# Patient Record
Sex: MALE | Race: WHITE | HISPANIC OR LATINO
[De-identification: names, ages, dates, MRNs, and addresses within clinical notes are randomized per-mention and may not be internally consistent; named-entity substitution may affect disease eponyms.]

---

## 2018-08-27 PROBLEM — Z00.00 ENCOUNTER FOR PREVENTIVE HEALTH EXAMINATION: Status: ACTIVE | Noted: 2018-08-27

## 2018-10-16 ENCOUNTER — APPOINTMENT (OUTPATIENT)
Dept: OPHTHALMOLOGY | Facility: CLINIC | Age: 63
End: 2018-10-16
Payer: COMMERCIAL

## 2018-10-16 PROCEDURE — 92004 COMPRE OPH EXAM NEW PT 1/>: CPT

## 2018-10-16 PROCEDURE — 92134 CPTRZ OPH DX IMG PST SGM RTA: CPT

## 2019-01-15 ENCOUNTER — APPOINTMENT (OUTPATIENT)
Dept: OPHTHALMOLOGY | Facility: CLINIC | Age: 64
End: 2019-01-15
Payer: COMMERCIAL

## 2019-01-15 PROCEDURE — 92235 FLUORESCEIN ANGRPH MLTIFRAME: CPT

## 2019-01-15 PROCEDURE — 92012 INTRM OPH EXAM EST PATIENT: CPT

## 2019-01-15 PROCEDURE — 92134 CPTRZ OPH DX IMG PST SGM RTA: CPT

## 2019-03-12 ENCOUNTER — APPOINTMENT (OUTPATIENT)
Dept: OPHTHALMOLOGY | Facility: CLINIC | Age: 64
End: 2019-03-12
Payer: MEDICARE

## 2019-03-12 DIAGNOSIS — H35.00 UNSPECIFIED BACKGROUND RETINOPATHY: ICD-10-CM

## 2019-03-12 DIAGNOSIS — H25.13 AGE-RELATED NUCLEAR CATARACT, BILATERAL: ICD-10-CM

## 2019-03-12 DIAGNOSIS — H35.352 CYSTOID MACULAR DEGENERATION, LEFT EYE: ICD-10-CM

## 2019-03-12 DIAGNOSIS — H35.3190 NONEXUDATIVE AGE-RELATED MACULAR DEGENERATION, UNSPECIFIED EYE, STAGE UNSPECIFIED: ICD-10-CM

## 2019-03-12 PROCEDURE — 92012 INTRM OPH EXAM EST PATIENT: CPT

## 2019-03-12 PROCEDURE — 92226: CPT | Mod: RT

## 2019-03-12 PROCEDURE — 92134 CPTRZ OPH DX IMG PST SGM RTA: CPT

## 2020-04-22 ENCOUNTER — APPOINTMENT (OUTPATIENT)
Dept: UROLOGY | Facility: CLINIC | Age: 65
End: 2020-04-22
Payer: COMMERCIAL

## 2020-04-22 ENCOUNTER — APPOINTMENT (OUTPATIENT)
Dept: UROLOGY | Facility: CLINIC | Age: 65
End: 2020-04-22

## 2020-04-22 DIAGNOSIS — Z78.9 OTHER SPECIFIED HEALTH STATUS: ICD-10-CM

## 2020-04-22 DIAGNOSIS — Z83.3 FAMILY HISTORY OF DIABETES MELLITUS: ICD-10-CM

## 2020-04-22 DIAGNOSIS — E11.9 TYPE 2 DIABETES MELLITUS W/OUT COMPLICATIONS: ICD-10-CM

## 2020-04-22 DIAGNOSIS — Z82.0 FAMILY HISTORY OF EPILEPSY AND OTHER DISEASES OF THE NERVOUS SYSTEM: ICD-10-CM

## 2020-04-22 DIAGNOSIS — I10 ESSENTIAL (PRIMARY) HYPERTENSION: ICD-10-CM

## 2020-04-22 DIAGNOSIS — Z80.0 FAMILY HISTORY OF MALIGNANT NEOPLASM OF DIGESTIVE ORGANS: ICD-10-CM

## 2020-04-22 DIAGNOSIS — Z87.891 PERSONAL HISTORY OF NICOTINE DEPENDENCE: ICD-10-CM

## 2020-04-22 DIAGNOSIS — J30.2 OTHER SEASONAL ALLERGIC RHINITIS: ICD-10-CM

## 2020-04-22 PROCEDURE — 99443: CPT

## 2020-04-22 RX ORDER — CETIRIZINE HCL 10 MG
TABLET ORAL
Refills: 0 | Status: ACTIVE | COMMUNITY

## 2020-04-22 NOTE — ASSESSMENT
[FreeTextEntry1] : I discussed the findings and options with Mr. ARIELA FERNÁNDEZ in detail. He will continue with the self dilations as needed.  I have ordered #14 and #12 [place] catheters (#10, R6, each) as well as the Lidocaine 2% jelly (30ml, #6, R12).  He will also continue with the nitrofurantoin low dose prophylaxis as well as the Cialis prn (Rx 20mg #10, R6).\par \par Once the COVID 19 pandemic has resolved Mr. Fernández will schedule an appointment for a flow rate, bladder sonogram and PSA.\par

## 2020-04-22 NOTE — LETTER BODY
[Dear  ___] : Dear  [unfilled], [Please see my note below.] : Please see my note below. [Consult Closing:] : Thank you very much for allowing me to participate in the care of this patient.  If you have any questions, please do not hesitate to contact me. [Consult Letter:] : I had the pleasure of evaluating your patient, [unfilled]. [Sincerely,] : Sincerely, [FreeTextEntry3] : Husam Plummer MD, FACS\par

## 2020-04-22 NOTE — HISTORY OF PRESENT ILLNESS
[FreeTextEntry1] : I contacted Mr. ARIELA FERNÁNDEZ by telemedicine using the [employer] platform. This was aborted due to system malfunction after more than 20 minutes of repeated tries. The appropriate consent was obtained prior to the conference.  The primary purpose of the meeting was to discuss his genitourinary symptoms.  \par \par Mr. Fernández has a long history of urethral stricture disease since undergoing a circumcision in 1970.  He had multiple urethral dilations and in December 2010 underwent an augmented urethroplasty using two buccal grafts (7+3 cm).  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 Fernández has moderate stable lower urinary tract symptoms (obstructive and irritative) with elevated postvoid residuals.  He had been on long-term Bactrim SS prophylaxis at bedtime but was changed to nitrofurantoin 50mg one year ago.  He has not had any symptomatic UTIs during the past year.  Mr. Fernández catheterizes the distal urethra approximately once every two weeks using usually a 14 [place] catheter (and rarely a 12 Fr). \par His most recent PVR (3/11/19) was 283cc. \par \par He also has a several year onset ED, for which he uses Cialis 1/2 20mg prn, without any side effects.\par \par PSAs: 4/13/18--0.18; 7/16--0.43; 6/20/16--0.4; 12/14/15--0.4; 12/3/14--0.3; 12/2/13--0.1; 12/5/12--0.4

## 2021-01-13 ENCOUNTER — APPOINTMENT (OUTPATIENT)
Dept: UROLOGY | Facility: CLINIC | Age: 66
End: 2021-01-13
Payer: COMMERCIAL

## 2021-01-20 ENCOUNTER — APPOINTMENT (OUTPATIENT)
Dept: UROLOGY | Facility: CLINIC | Age: 66
End: 2021-01-20
Payer: COMMERCIAL

## 2021-01-20 ENCOUNTER — TRANSCRIPTION ENCOUNTER (OUTPATIENT)
Age: 66
End: 2021-01-20

## 2021-01-20 VITALS
DIASTOLIC BLOOD PRESSURE: 81 MMHG | OXYGEN SATURATION: 98 % | SYSTOLIC BLOOD PRESSURE: 137 MMHG | BODY MASS INDEX: 26.22 KG/M2 | HEART RATE: 69 BPM | WEIGHT: 169 LBS | HEIGHT: 67.5 IN

## 2021-01-20 PROCEDURE — 51741 ELECTRO-UROFLOWMETRY FIRST: CPT

## 2021-01-20 PROCEDURE — 99215 OFFICE O/P EST HI 40 MIN: CPT | Mod: 25

## 2021-01-20 PROCEDURE — 81003 URINALYSIS AUTO W/O SCOPE: CPT | Mod: QW

## 2021-01-20 PROCEDURE — 51798 US URINE CAPACITY MEASURE: CPT | Mod: 59

## 2021-01-20 RX ORDER — LIDOCAINE HYDROCHLORIDE 20 MG/ML
2 JELLY TOPICAL
Qty: 5 | Refills: 11 | Status: ACTIVE | COMMUNITY
Start: 2020-03-05 | End: 1900-01-01

## 2021-01-20 RX ORDER — AMLODIPINE BESYLATE 5 MG/1
5 TABLET ORAL
Refills: 0 | Status: ACTIVE | COMMUNITY

## 2021-01-20 RX ORDER — NITROFURANTOIN MACROCRYSTALS 50 MG/1
50 CAPSULE ORAL
Qty: 90 | Refills: 3 | Status: ACTIVE | COMMUNITY
Start: 2020-03-05 | End: 1900-01-01

## 2021-01-20 RX ORDER — TADALAFIL 20 MG/1
20 TABLET ORAL
Qty: 12 | Refills: 6 | Status: ACTIVE | COMMUNITY
Start: 1900-01-01 | End: 1900-01-01

## 2021-01-20 RX ORDER — ALFUZOSIN HYDROCHLORIDE 10 MG/1
10 TABLET, EXTENDED RELEASE ORAL
Qty: 90 | Refills: 3 | Status: ACTIVE | COMMUNITY
Start: 2021-01-20 | End: 1900-01-01

## 2021-01-20 NOTE — ASSESSMENT
[FreeTextEntry1] : I discussed the findings and options with Mr. ARIELA FERNÁNDEZ in detail. I am concerned about the increased retention.  Mr. Fernández will try and dilate more regularly, monitoring his residuals with each catheterization. In addition, he will start on alfuzosin 10mg qhs.  I would like to see him in 3 months (flow, bladder sono).  He will continue with the nitrofurantoin low dose prophylaxis as well as the Cialis prn.  He will likely need a RUG at some point in the near future. \par I will call Mr. Fernández with the PSA and CMP results. \par \par \par

## 2021-01-20 NOTE — PHYSICAL EXAM
[Abdomen Mass (___ Cm)] : no abdominal mass palpated [Abdomen Hernia] : no hernia was discovered [Penis Abnormality] : normal circumcised penis [Epididymis] : the epididymides were normal [Testes Tenderness] : no tenderness of the testes [Prostate Tenderness] : the prostate was not tender [Skin Color & Pigmentation] : normal skin color and pigmentation [Heart Rate And Rhythm] : Heart rate and rhythm were normal [FreeTextEntry1] : glans LSA with meatal stenosis

## 2021-01-20 NOTE — HISTORY OF PRESENT ILLNESS
[FreeTextEntry1] : Mr. ARIELA FERNÁNDEZ comes in today for his urologic follow-up.  He presents with relatively stable moderate lower urinary tract symptoms (obstructive and irritative) with elevated postvoid residuals.  He is continuing on nitrofurantoin 50mg prophylaxis (having been on Bactrim SS previously).  He has not had any symptomatic UTIs for approximately the past 2 years.  Mr. Fernández catheterizes the distal urethra approximately once or twice a week using usually a 14 [place] catheter (and rarely a 12 Fr). \par IPSS: 22/35\par Sono:  440cc PVR (2nd attempt) (His prior PVR from 3/11/19 was 283cc). \par Flow: Maximum: 8.2 ml/s, Volume: 248.7ml\par \par Mr. Fernández has a long history of urethral stricture disease since undergoing a circumcision in 1970.  He had multiple urethral dilations and in December 2010 underwent an augmented urethroplasty using two buccal grafts (7+3 cm).  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 Fernández has a long history of genital herpes.  He has not had a visible outbreak for >10 years.\par \par PSAs: 4/13/18--0.18; 7/16--0.43; 6/20/16--0.4; 12/14/15--0.4; 12/3/14--0.3; 12/2/13--0.1; 12/5/12--0.4\par \par Creat: 11/18/10--1.2; 7/8/10--1.8

## 2021-01-21 LAB
ALBUMIN SERPL ELPH-MCNC: 4.6 G/DL
ALP BLD-CCNC: 84 U/L
ALT SERPL-CCNC: 40 U/L
ANION GAP SERPL CALC-SCNC: 13 MMOL/L
AST SERPL-CCNC: 34 U/L
BILIRUB SERPL-MCNC: 0.8 MG/DL
BUN SERPL-MCNC: 15 MG/DL
CALCIUM SERPL-MCNC: 9.9 MG/DL
CHLORIDE SERPL-SCNC: 100 MMOL/L
CO2 SERPL-SCNC: 24 MMOL/L
CREAT SERPL-MCNC: 1.12 MG/DL
GLUCOSE SERPL-MCNC: 128 MG/DL
POTASSIUM SERPL-SCNC: 5 MMOL/L
PROT SERPL-MCNC: 7.8 G/DL
SODIUM SERPL-SCNC: 137 MMOL/L

## 2021-04-07 ENCOUNTER — APPOINTMENT (OUTPATIENT)
Dept: UROLOGY | Facility: CLINIC | Age: 66
End: 2021-04-07
Payer: COMMERCIAL

## 2021-04-07 VITALS
RESPIRATION RATE: 14 BRPM | TEMPERATURE: 98.4 F | HEART RATE: 88 BPM | BODY MASS INDEX: 23.58 KG/M2 | SYSTOLIC BLOOD PRESSURE: 149 MMHG | OXYGEN SATURATION: 96 % | HEIGHT: 67.5 IN | WEIGHT: 152 LBS | DIASTOLIC BLOOD PRESSURE: 84 MMHG

## 2021-04-07 PROCEDURE — 51741 ELECTRO-UROFLOWMETRY FIRST: CPT

## 2021-04-07 PROCEDURE — 51798 US URINE CAPACITY MEASURE: CPT

## 2021-04-07 PROCEDURE — 99215 OFFICE O/P EST HI 40 MIN: CPT

## 2021-04-07 NOTE — HISTORY OF PRESENT ILLNESS
[FreeTextEntry1] : Mr. ARIELA FERNÁNDEZ comes in today for his urologic follow-up.  He reports stable voiding symptoms (obstructive and irritative) with known longstanding postvoid residuals. He is continuing on nitrofurantoin 50mg prophylaxis (having been on Bactrim SS previously) and alfuzosin 10mg qhs.  He has not had any symptomatic UTIs for approximately the past 2 years.  Mr. Fernández catheterizes the distal urethra approximately once or twice a week using usually a 14 [place] catheter (and rarely a 12 Fr). \par IPSS: 19/35\par Sono:  578cc\par Flow: Peak: 6.1ml/s, Volume 277.9ml\par \par Mr. Fernández has a long history of urethral stricture disease since undergoing a circumcision in 1970.  He had multiple urethral dilations and in December 2010 underwent an augmented urethroplasty using two buccal grafts (7+3 cm).  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 Fernández has a long history of genital herpes.  He has not had a visible outbreak for >10 years and does not premedicate.\par \par PSAs: 4/13/18--0.18; 7/16--0.43; 6/20/16--0.4; 12/14/15--0.4; 12/3/14--0.3; 12/2/13--0.1; 12/5/12--0.4\par \par Creat: 1/21/21--1.12; 11/18/10--1.2; 7/8/10--1.8

## 2021-04-07 NOTE — PHYSICAL EXAM
[General Appearance - Well Developed] : well developed [General Appearance - Well Nourished] : well nourished [Normal Appearance] : normal appearance [Well Groomed] : well groomed [General Appearance - In No Acute Distress] : no acute distress [Abdomen Soft] : soft [Abdomen Tenderness] : non-tender [Abdomen Mass (___ Cm)] : no abdominal mass palpated [Costovertebral Angle Tenderness] : no ~M costovertebral angle tenderness [Penis Abnormality] : normal circumcised penis [Urinary Bladder Findings] : the bladder was normal on palpation [Scrotum] : the scrotum was normal [Epididymis] : the epididymides were normal [Testes Tenderness] : no tenderness of the testes [Testes Mass (___cm)] : there were no testicular masses [Skin Color & Pigmentation] : normal skin color and pigmentation [Heart Rate And Rhythm] : Heart rate and rhythm were normal [Edema] : no peripheral edema [] : no respiratory distress [Exaggerated Use Of Accessory Muscles For Inspiration] : no accessory muscle use [Respiration, Rhythm And Depth] : normal respiratory rhythm and effort [Oriented To Time, Place, And Person] : oriented to person, place, and time [Affect] : the affect was normal [Mood] : the mood was normal [Not Anxious] : not anxious [No Focal Deficits] : no focal deficits [Normal Station and Gait] : the gait and station were normal for the patient's age [No Palpable Adenopathy] : no palpable adenopathy [FreeTextEntry1] : Glans LSA with meatal stenosis

## 2021-04-07 NOTE — ADDENDUM
[FreeTextEntry1] : A portion of this note was written by [Ramesh Whitney] on 04/05/2021 acting as a scribe for Dr. Plummer. \par \par I have personally reviewed the chart and agree that the record accurately reflects my personal performance of the history, physical exam, assessment, and plan.

## 2021-04-07 NOTE — LETTER BODY
[Dear  ___] : Dear  [unfilled], [Consult Letter:] : I had the pleasure of evaluating your patient, [unfilled]. [Please see my note below.] : Please see my note below. [Consult Closing:] : Thank you very much for allowing me to participate in the care of this patient.  If you have any questions, please do not hesitate to contact me. [Sincerely,] : Sincerely, [FreeTextEntry3] : Husam Plummer MD, FACS\par

## 2021-04-14 ENCOUNTER — APPOINTMENT (OUTPATIENT)
Dept: HEART AND VASCULAR | Facility: CLINIC | Age: 66
End: 2021-04-14
Payer: COMMERCIAL

## 2021-04-14 ENCOUNTER — LABORATORY RESULT (OUTPATIENT)
Age: 66
End: 2021-04-14

## 2021-04-14 PROCEDURE — 81050 URINALYSIS VOLUME MEASURE: CPT

## 2021-05-03 ENCOUNTER — APPOINTMENT (OUTPATIENT)
Dept: UROLOGY | Facility: CLINIC | Age: 66
End: 2021-05-03
Payer: MEDICARE

## 2021-05-03 VITALS
HEART RATE: 73 BPM | DIASTOLIC BLOOD PRESSURE: 78 MMHG | OXYGEN SATURATION: 98 % | SYSTOLIC BLOOD PRESSURE: 124 MMHG | TEMPERATURE: 97.9 F

## 2021-05-03 DIAGNOSIS — L90.0 LICHEN SCLEROSUS ET ATROPHICUS: ICD-10-CM

## 2021-05-03 DIAGNOSIS — N18.2 CHRONIC KIDNEY DISEASE, STAGE 2 (MILD): ICD-10-CM

## 2021-05-03 DIAGNOSIS — R35.1 BENIGN PROSTATIC HYPERPLASIA WITH LOWER URINARY TRACT SYMPMS: ICD-10-CM

## 2021-05-03 DIAGNOSIS — R33.9 RETENTION OF URINE, UNSPECIFIED: ICD-10-CM

## 2021-05-03 DIAGNOSIS — Q55.9 CONGENITAL MALFORMATION OF MALE GENITAL ORGAN, UNSPECIFIED: ICD-10-CM

## 2021-05-03 DIAGNOSIS — N35.914 UNSPECIFIED ANTERIOR URETHRAL STRICTURE, MALE: ICD-10-CM

## 2021-05-03 DIAGNOSIS — A60.02 HERPESVIRAL INFECTION OF OTHER MALE GENITAL ORGANS: ICD-10-CM

## 2021-05-03 DIAGNOSIS — N40.1 BENIGN PROSTATIC HYPERPLASIA WITH LOWER URINARY TRACT SYMPMS: ICD-10-CM

## 2021-05-03 DIAGNOSIS — N52.01 ERECTILE DYSFUNCTION DUE TO ARTERIAL INSUFFICIENCY: ICD-10-CM

## 2021-05-03 DIAGNOSIS — R39.9 UNSPECIFIED SYMPTOMS AND SIGNS INVOLVING THE GENITOURINARY SYSTEM: ICD-10-CM

## 2021-05-03 PROCEDURE — 99215 OFFICE O/P EST HI 40 MIN: CPT | Mod: 25

## 2021-05-03 PROCEDURE — 99072 ADDL SUPL MATRL&STAF TM PHE: CPT

## 2021-05-03 PROCEDURE — 51741 ELECTRO-UROFLOWMETRY FIRST: CPT

## 2021-05-03 PROCEDURE — 51610 INJECTION FOR BLADDER X-RAY: CPT

## 2021-05-03 NOTE — HISTORY OF PRESENT ILLNESS
[FreeTextEntry1] : Mr. ARIELA FERNÁNDEZ comes in today for his urologic follow-up, including the appropriate urethral imaging.\par \par Mr. Fernández has a long history of urethral stricture disease since undergoing a circumcision in 1970.  He had multiple urethral dilations and in December 2010 underwent an augmented urethroplasty using two buccal grafts (7+3 cm).  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 He is continuing on nitrofurantoin 50mg prophylaxis (having been on Bactrim SS previously) and alfuzosin 10mg qhs.  He has not had any symptomatic UTIs for approximately the past 2 years.  Mr. Fernández catheterizes the distal urethra approximately once or twice a week using usually a 14 [place] catheter (and rarely a 12 Fr). \par IPSS: 19/35  \par Flow: 6.4ml/sec; 121ml voided.\par \par He also has a several year onset ED, for which he uses Cialis 20mg prn, without any side effects.\par \par Mr. Fernández has a long history of genital herpes.  He has not had a visible outbreak for >10 years and does not premedicate.\par \par PSAs: 4/13/18--0.18; 7/16--0.43; 6/20/16--0.4; 12/14/15--0.4; 12/3/14--0.3; 12/2/13--0.1; 12/5/12--0.4\par \par Creat: 1/21/21--1.12; 11/18/10--1.2; 7/8/10--1.8

## 2021-05-03 NOTE — ASSESSMENT
[FreeTextEntry1] : I discussed the findings and options with Mr. ARIELA FERNÁNDEZ in detail.  I reviewed the urethral imaging which confirms a pan urethral stricture.  The options for this include:\par \par 1.  A limited endoscopic balloon dilation and focal internal urethrotomy.\par 2.  A perineal urethrostomy.\par 3.  An extensive urethral reconstruction requiring multiple grafts and possibly a two-stage approach.\par \par Mr. Fernández does not want any additional intervention for now and will continue with self dilations.  He understands that I am concerned about the bleeding which these cause, as well as his significant urinary retention.  We agreed that he would return in September for follow-up but should call us if anything changes.  A CMP should be ordered through his Internist's office in 6 to 8 weeks.  \par \par (Mr. Fernández will be relocating to [place] in the early summer).\par \par

## 2021-05-03 NOTE — PHYSICAL EXAM
[General Appearance - Well Developed] : well developed [General Appearance - Well Nourished] : well nourished [Normal Appearance] : normal appearance [Well Groomed] : well groomed [General Appearance - In No Acute Distress] : no acute distress [Abdomen Soft] : soft [Abdomen Tenderness] : non-tender [Abdomen Mass (___ Cm)] : no abdominal mass palpated [Costovertebral Angle Tenderness] : no ~M costovertebral angle tenderness [Penis Abnormality] : normal circumcised penis [Urinary Bladder Findings] : the bladder was normal on palpation [Scrotum] : the scrotum was normal [Epididymis] : the epididymides were normal [Testes Tenderness] : no tenderness of the testes [Testes Mass (___cm)] : there were no testicular masses [] : no respiratory distress [Respiration, Rhythm And Depth] : normal respiratory rhythm and effort [Exaggerated Use Of Accessory Muscles For Inspiration] : no accessory muscle use [Oriented To Time, Place, And Person] : oriented to person, place, and time [Affect] : the affect was normal [Mood] : the mood was normal [Not Anxious] : not anxious [Normal Station and Gait] : the gait and station were normal for the patient's age [FreeTextEntry1] : Glans LSA with meatal stenosis

## 2021-05-03 NOTE — ADDENDUM
[FreeTextEntry1] : A portion of this note was written by [Ramesh Whitney] on 04/28/2021 acting as a scribe for Dr. Plummer. \par \par I have personally reviewed the chart and agree that the record accurately reflects my personal performance of the history, physical exam, assessment, and plan.

## 2021-05-04 LAB
BILIRUB UR QL STRIP: NORMAL
CLARITY UR: CLEAR
COLLECTION METHOD: NORMAL
GLUCOSE UR-MCNC: NORMAL
HCG UR QL: 1 EU/DL
HGB UR QL STRIP.AUTO: NORMAL
KETONES UR-MCNC: NORMAL
LEUKOCYTE ESTERASE UR QL STRIP: NORMAL
NITRITE UR QL STRIP: POSITIVE
PH UR STRIP: 6
PROT UR STRIP-MCNC: NORMAL
SP GR UR STRIP: 1.02

## 2021-05-07 LAB — BACTERIA UR CULT: ABNORMAL

## 2025-06-29 NOTE — PHYSICAL EXAM
. [General Appearance - Well Developed] : well developed [General Appearance - Well Nourished] : well nourished [Well Groomed] : well groomed [Normal Appearance] : normal appearance [General Appearance - In No Acute Distress] : no acute distress [Oriented To Time, Place, And Person] : oriented to person, place, and time [Affect] : the affect was normal [Mood] : the mood was normal [Not Anxious] : not anxious